# Patient Record
Sex: MALE | Race: BLACK OR AFRICAN AMERICAN | NOT HISPANIC OR LATINO | URBAN - METROPOLITAN AREA
[De-identification: names, ages, dates, MRNs, and addresses within clinical notes are randomized per-mention and may not be internally consistent; named-entity substitution may affect disease eponyms.]

---

## 2021-06-04 ENCOUNTER — OFFICE VISIT (OUTPATIENT)
Dept: URBAN - METROPOLITAN AREA CLINIC 63 | Facility: CLINIC | Age: 61
End: 2021-06-04

## 2021-06-24 ENCOUNTER — OFFICE VISIT (OUTPATIENT)
Dept: URBAN - METROPOLITAN AREA SURGERY CENTER 4 | Facility: SURGERY CENTER | Age: 61
End: 2021-06-24

## 2021-06-25 LAB — PATHOLOGY (INDENTED REPORT): (no result)

## 2022-03-15 ENCOUNTER — APPOINTMENT (RX ONLY)
Dept: URBAN - METROPOLITAN AREA CLINIC 328 | Facility: CLINIC | Age: 62
Setting detail: DERMATOLOGY
End: 2022-03-15

## 2022-03-15 DIAGNOSIS — L663 OTHER SPECIFIED DISEASES OF HAIR AND HAIR FOLLICLES: ICD-10-CM | Status: WELL CONTROLLED

## 2022-03-15 DIAGNOSIS — L738 OTHER SPECIFIED DISEASES OF HAIR AND HAIR FOLLICLES: ICD-10-CM | Status: WELL CONTROLLED

## 2022-03-15 DIAGNOSIS — L73.9 FOLLICULAR DISORDER, UNSPECIFIED: ICD-10-CM | Status: WELL CONTROLLED

## 2022-03-15 DIAGNOSIS — L63.8 OTHER ALOPECIA AREATA: ICD-10-CM

## 2022-03-15 PROBLEM — D23.61 OTHER BENIGN NEOPLASM OF SKIN OF RIGHT UPPER LIMB, INCLUDING SHOULDER: Status: ACTIVE | Noted: 2022-03-15

## 2022-03-15 PROBLEM — L02.222 FURUNCLE OF BACK [ANY PART, EXCEPT BUTTOCK]: Status: ACTIVE | Noted: 2022-03-15

## 2022-03-15 PROCEDURE — ? COUNSELING

## 2022-03-15 PROCEDURE — 11900 INJECT SKIN LESIONS </W 7: CPT

## 2022-03-15 PROCEDURE — 99213 OFFICE O/P EST LOW 20 MIN: CPT | Mod: 25

## 2022-03-15 PROCEDURE — ? IN-HOUSE DISPENSING PHARMACY

## 2022-03-15 PROCEDURE — ? PATHOLOGY DISCUSSION

## 2022-03-15 PROCEDURE — ? INTRALESIONAL KENALOG

## 2022-03-15 PROCEDURE — ? ADDITIONAL NOTES

## 2022-03-15 ASSESSMENT — LOCATION DETAILED DESCRIPTION DERM
LOCATION DETAILED: RIGHT OCCIPITAL SCALP
LOCATION DETAILED: INFERIOR THORACIC SPINE

## 2022-03-15 ASSESSMENT — LOCATION ZONE DERM
LOCATION ZONE: SCALP
LOCATION ZONE: TRUNK

## 2022-03-15 ASSESSMENT — LOCATION SIMPLE DESCRIPTION DERM
LOCATION SIMPLE: UPPER BACK
LOCATION SIMPLE: POSTERIOR SCALP

## 2022-03-15 NOTE — PROCEDURE: IN-HOUSE DISPENSING PHARMACY
Product 78 Units Dispensed: 0
Product 5 Application Directions: For Warts/MC- apply to the areas 2-3 times weekly\\nFor (Pre)Skin Cancer- apply nightly for 2-6 weeks or as directed by provider.
Product 7 Amount/Unit (Numbers Only): 1
Product 13 Unit Type: bottle(s)
Product 63 Unit Type: mg
Product 24 Unit Type: capsules
Product 22 Amount/Unit (Numbers Only): 18
Product 16 Application Directions: Apply to the affected area 1-2 times daily or as directed by provider.
Product 18 Amount/Unit (Numbers Only): 20
Product 1 Application Directions: Apply to the face twice daily (if another rx is being prescribed it will be applied in the AM)
Name Of Product 14: #14 Rosacea Silicone Gel
Product 20 Unit Type: tablets
Name Of Product 21: #21 Minocycline
Name Of Product 10: #10 Fungal Dermatitis Cream
Name Of Product 6: #6 Alopecia Topical Solution
Product 12 Price/Unit (In Dollars): 60
Product 23 Price/Unit (In Dollars): 10
Product 4 Price/Unit (In Dollars): 55
Name Of Product 2: #2 Acne Moisturizing Cream
Product 8 Price/Unit (In Dollars): 35
Name Of Product 17: #17 Xerosis Gel
Product 14 Application Directions: Apply to the face once daily AM or PM
Product 10 Application Directions: Apply to the affected area twice daily for 10 days.
Product 23 Amount/Unit (Numbers Only): 5
Product 21 Application Directions: Take one tablet twice daily with food.
Product 4 Application Directions: Apply to the face at bedtime or as directed by provider.
Product 19 Application Directions: Take one tablet twice daily with food or as directed by provider.
Render Product Pricing In Note: No
Name Of Product 24: #24 Spironolactone
Name Of Product 13: #13 Rosacea Cream
Product 15 Price/Unit (In Dollars): 30
Name Of Product 5: #5 Actinic Keratosis (Gel)
Render Refills If Set To 0: Yes
Name Of Product 9: #9 Dermatitis Cream
Name Of Product 20: #20 Loratadine
Name Of Product 1: #1 Acne Gel
Name Of Product 16: #16 Dermatitis Topical Solution
Product 13 Application Directions: Apply to the face twice daily or as directed by provider.
Product 24 Application Directions: Take one tablet twice daily or as directed by provider.
Product 3 Price/Unit (In Dollars): 40
Product 9 Application Directions: Apply to the affected area three times weekly.
Product 20 Application Directions: Take one tablet daily.
Product 7 Application Directions: Apply to the nails daily. Please do not apply socks for about5 hours (best if applied at night) Remove once weekly then start again.
Product 20 Amount/Unit (Numbers Only): 30
Product 3 Application Directions: Apply to the face once daily AM or PM or as directed by provider.
Name Of Product 8: #8 Anti Fungal Cream
Product 14 [place]/Unit (In Dollars): 45
Name Of Product 12: #12 Melasma Emulsion
Name Of Product 23: #23 Prednisone 50mg
Name Of Product 19: #19 Doxycycline
Detail Level: Zone
Name Of Product 4: #4 Acne Gel
Product 2 Price/Unit (In Dollars): 50
Product 23 Application Directions: Use as directed by provider.
Product 6 Refills: 3
Product 8 Application Directions: Apply to the affected area twice daily.
Product 12 Application Directions: Apply to the face three times weekly at night for two months only. Take a two month break and then continue for two months again. (On your office months, you can apply Lytera daily)
Product 17 Application Directions: Apply to affected area nightly.
Product 6 Application Directions: Apply to the scalp three times weekly
Product 2 Application Directions: Apply to the face at bedtime. Start every other night then increase usage depending on tolerance.
Name Of Product 15: #15 Anti Fungal Shampoo
Name Of Product 11: #11 Antibacterial Ointment
Name Of Product 22: #22 Prednisone 20mg
Name Of Product 18: #18 Bactrim
Name Of Product 7: #7 Anti Fungal Nail Solution
Product 15 Application Directions: Apply to the scalp 2-3 times weekly. Can be applied every other shampoo as well.
Product 20 Price/Unit (In Dollars): 15
Name Of Product 3: #3 Acne Gel Combo
Product 11 Application Directions: Apply twice daily to wound.

## 2022-03-15 NOTE — PROCEDURE: INTRALESIONAL KENALOG
X Size Of Lesion In Cm (Optional): 0
Medical Necessity Clause: This procedure was medically necessary because the lesions that were treated were:
Include Z78.9 (Other Specified Conditions Influencing Health Status) As An Associated Diagnosis?: No
Detail Level: Detailed
Total Volume Injected (Ccs- Only Use Numbers And Decimals): .2
Concentration Of Solution Injected (Mg/Ml): 10.0
Kenalog Preparation: Kenalog
Consent: The risks of atrophy were reviewed with the patient.
Validate Note Data When Using Inventory: Yes

## 2022-05-18 ENCOUNTER — APPOINTMENT (RX ONLY)
Dept: URBAN - METROPOLITAN AREA CLINIC 328 | Facility: CLINIC | Age: 62
Setting detail: DERMATOLOGY
End: 2022-05-18

## 2022-05-18 DIAGNOSIS — L663 OTHER SPECIFIED DISEASES OF HAIR AND HAIR FOLLICLES: ICD-10-CM

## 2022-05-18 DIAGNOSIS — L738 OTHER SPECIFIED DISEASES OF HAIR AND HAIR FOLLICLES: ICD-10-CM

## 2022-05-18 DIAGNOSIS — L63.8 OTHER ALOPECIA AREATA: ICD-10-CM

## 2022-05-18 DIAGNOSIS — L73.9 FOLLICULAR DISORDER, UNSPECIFIED: ICD-10-CM

## 2022-05-18 PROBLEM — L02.424 FURUNCLE OF LEFT UPPER LIMB: Status: ACTIVE | Noted: 2022-05-18

## 2022-05-18 PROBLEM — L02.423 FURUNCLE OF RIGHT UPPER LIMB: Status: ACTIVE | Noted: 2022-05-18

## 2022-05-18 PROCEDURE — ? PRESCRIPTION MEDICATION MANAGEMENT

## 2022-05-18 PROCEDURE — ? COUNSELING

## 2022-05-18 PROCEDURE — 11900 INJECT SKIN LESIONS </W 7: CPT

## 2022-05-18 PROCEDURE — ? INTRALESIONAL KENALOG

## 2022-05-18 PROCEDURE — ? PRESCRIPTION

## 2022-05-18 PROCEDURE — 99213 OFFICE O/P EST LOW 20 MIN: CPT | Mod: 25

## 2022-05-18 RX ORDER — MINOCYCLINE HYDROCHLORIDE 100 MG/1
TABLET ORAL
Qty: 20 | Refills: 1 | Status: ERX | COMMUNITY
Start: 2022-05-18

## 2022-05-18 RX ADMIN — MINOCYCLINE HYDROCHLORIDE: 100 TABLET ORAL at 00:00

## 2022-05-18 ASSESSMENT — LOCATION SIMPLE DESCRIPTION DERM
LOCATION SIMPLE: POSTERIOR SCALP
LOCATION SIMPLE: LEFT SHOULDER
LOCATION SIMPLE: RIGHT SHOULDER

## 2022-05-18 ASSESSMENT — LOCATION DETAILED DESCRIPTION DERM
LOCATION DETAILED: RIGHT POSTERIOR SHOULDER
LOCATION DETAILED: RIGHT OCCIPITAL SCALP
LOCATION DETAILED: LEFT POSTERIOR SHOULDER

## 2022-05-18 ASSESSMENT — LOCATION ZONE DERM
LOCATION ZONE: ARM
LOCATION ZONE: SCALP

## 2022-05-18 NOTE — PROCEDURE: COUNSELING
Detail Level: Detailed
Chlorhexidine Recommendations: Hibiclens - This can be purchased over the counter. Use as a body wash twice a week

## 2022-05-18 NOTE — PROCEDURE: PRESCRIPTION MEDICATION MANAGEMENT
Detail Level: Zone
Render In Strict Bullet Format?: No
Continue Regimen: Dispensary #6 alopecia solution

## 2022-05-18 NOTE — PROCEDURE: INTRALESIONAL KENALOG
X Size Of Lesion In Cm (Optional): 0
Medical Necessity Clause: This procedure was medically necessary because the lesions that were treated were:
Include Z78.9 (Other Specified Conditions Influencing Health Status) As An Associated Diagnosis?: No
Detail Level: Detailed
Total Volume Injected (Ccs- Only Use Numbers And Decimals): .3
Concentration Of Solution Injected (Mg/Ml): 10.0
Kenalog Preparation: Kenalog
Consent: The risks of atrophy were reviewed with the patient.
Validate Note Data When Using Inventory: Yes

## 2022-06-02 ENCOUNTER — APPOINTMENT (RX ONLY)
Dept: URBAN - METROPOLITAN AREA CLINIC 328 | Facility: CLINIC | Age: 62
Setting detail: DERMATOLOGY
End: 2022-06-02

## 2022-06-02 DIAGNOSIS — L20.89 OTHER ATOPIC DERMATITIS: ICD-10-CM | Status: IMPROVED

## 2022-06-02 DIAGNOSIS — L663 OTHER SPECIFIED DISEASES OF HAIR AND HAIR FOLLICLES: ICD-10-CM

## 2022-06-02 DIAGNOSIS — L63.8 OTHER ALOPECIA AREATA: ICD-10-CM

## 2022-06-02 DIAGNOSIS — L738 OTHER SPECIFIED DISEASES OF HAIR AND HAIR FOLLICLES: ICD-10-CM

## 2022-06-02 DIAGNOSIS — L73.9 FOLLICULAR DISORDER, UNSPECIFIED: ICD-10-CM

## 2022-06-02 PROBLEM — L20.84 INTRINSIC (ALLERGIC) ECZEMA: Status: ACTIVE | Noted: 2022-06-02

## 2022-06-02 PROBLEM — L02.424 FURUNCLE OF LEFT UPPER LIMB: Status: ACTIVE | Noted: 2022-06-02

## 2022-06-02 PROBLEM — L02.423 FURUNCLE OF RIGHT UPPER LIMB: Status: ACTIVE | Noted: 2022-06-02

## 2022-06-02 PROBLEM — L02.821 FURUNCLE OF HEAD [ANY PART, EXCEPT FACE]: Status: ACTIVE | Noted: 2022-06-02

## 2022-06-02 PROCEDURE — ? COUNSELING

## 2022-06-02 PROCEDURE — ? PRESCRIPTION MEDICATION MANAGEMENT

## 2022-06-02 PROCEDURE — 99214 OFFICE O/P EST MOD 30 MIN: CPT

## 2022-06-02 ASSESSMENT — LOCATION SIMPLE DESCRIPTION DERM
LOCATION SIMPLE: ANTERIOR SCALP
LOCATION SIMPLE: LEFT UPPER BACK
LOCATION SIMPLE: LEFT UPPER BACK
LOCATION SIMPLE: RIGHT SCALP
LOCATION SIMPLE: RIGHT SHOULDER
LOCATION SIMPLE: LEFT SHOULDER

## 2022-06-02 ASSESSMENT — LOCATION DETAILED DESCRIPTION DERM
LOCATION DETAILED: MID-FRONTAL SCALP
LOCATION DETAILED: LEFT POSTERIOR SHOULDER
LOCATION DETAILED: RIGHT CENTRAL FRONTAL SCALP
LOCATION DETAILED: LEFT SUPERIOR UPPER BACK
LOCATION DETAILED: RIGHT POSTERIOR SHOULDER
LOCATION DETAILED: LEFT SUPERIOR UPPER BACK

## 2022-06-02 ASSESSMENT — LOCATION ZONE DERM
LOCATION ZONE: SCALP
LOCATION ZONE: TRUNK
LOCATION ZONE: ARM
LOCATION ZONE: TRUNK

## 2022-06-02 NOTE — PROCEDURE: PRESCRIPTION MEDICATION MANAGEMENT
Continue Regimen: Per DS continue using triamcinolone on affected areas
Detail Level: Zone
Render In Strict Bullet Format?: No
Continue Regimen: Continue using Rx solution as needed
Continue Regimen: Continue using clindamycin bid as needed

## 2022-07-09 ENCOUNTER — TELEPHONE ENCOUNTER (OUTPATIENT)
Dept: URBAN - METROPOLITAN AREA CLINIC 121 | Facility: CLINIC | Age: 62
End: 2022-07-09

## 2022-07-09 RX ORDER — CYCLOBENZAPRINE HYDROCHLORIDE 10 MG/1
TABLET, FILM COATED ORAL AS NEEDED
Refills: 0 | OUTPATIENT
Start: 2017-06-28 | End: 2017-07-13

## 2022-07-09 RX ORDER — CYCLOBENZAPRINE HYDROCHLORIDE 10 MG/1
TABLET, FILM COATED ORAL
Refills: 0 | OUTPATIENT
Start: 2015-12-09 | End: 2017-06-28

## 2022-07-09 RX ORDER — CYCLOBENZAPRINE HYDROCHLORIDE 10 MG/1
TABLET, FILM COATED ORAL AS NEEDED
Refills: 0 | OUTPATIENT
Start: 2017-09-07 | End: 2021-06-04

## 2022-07-09 RX ORDER — OXYCODONE AND ACETAMINOPHEN 7.5; 325 MG/1; MG/1
TABLET ORAL AS NEEDED
Refills: 0 | OUTPATIENT
Start: 2017-05-15 | End: 2017-07-13

## 2022-07-09 RX ORDER — CYCLOBENZAPRINE HYDROCHLORIDE 10 MG/1
TABLET, FILM COATED ORAL AS NEEDED
Refills: 0 | OUTPATIENT
Start: 2017-07-13 | End: 2017-09-07

## 2022-07-09 RX ORDER — CYCLOBENZAPRINE HYDROCHLORIDE 10 MG/1
TABLET, FILM COATED ORAL
Refills: 0 | OUTPATIENT
Start: 2015-11-16 | End: 2015-12-09

## 2022-07-09 RX ORDER — OXYCODONE AND ACETAMINOPHEN 325; 10 MG/1; MG/1
1 TAB QID TABLET ORAL
Refills: 0 | OUTPATIENT
Start: 2015-11-16 | End: 2015-12-09

## 2022-07-09 RX ORDER — OXYCODONE AND ACETAMINOPHEN 325; 10 MG/1; MG/1
1 TAB QID TABLET ORAL
Refills: 0 | OUTPATIENT
Start: 2015-12-09 | End: 2017-06-28

## 2022-07-09 RX ORDER — OXYCODONE AND ACETAMINOPHEN 325; 10 MG/1; MG/1
1 TAB QID TABLET ORAL AS NEEDED
Refills: 0 | OUTPATIENT
Start: 2017-06-28 | End: 2017-06-28

## 2022-07-10 ENCOUNTER — TELEPHONE ENCOUNTER (OUTPATIENT)
Dept: URBAN - METROPOLITAN AREA CLINIC 121 | Facility: CLINIC | Age: 62
End: 2022-07-10

## 2022-07-10 RX ORDER — POLYETHYLENE GLYCOL 3350, SODIUM SULFATE, SODIUM CHLORIDE, POTASSIUM CHLORIDE, ASCORBIC ACID, SODIUM ASCORBATE 140-9-5.2G
TAKE AS DIRECTED KIT ORAL TAKE AS DIRECTED
Refills: 0 | Status: ACTIVE | COMMUNITY
Start: 2021-06-04

## 2022-07-10 RX ORDER — AMLODIPINE BESYLATE 5 MG/1
TABLET ORAL
Refills: 0 | Status: ACTIVE | COMMUNITY
Start: 2021-05-21

## 2022-07-10 RX ORDER — ASPIRIN 81 MG/1
TABLET, CHEWABLE ORAL
Refills: 0 | Status: ACTIVE | COMMUNITY
Start: 2021-06-04

## 2022-07-10 RX ORDER — ESCITALOPRAM 20 MG/1
TABLET, FILM COATED ORAL
Refills: 0 | Status: ACTIVE | COMMUNITY
Start: 2021-03-16

## 2022-07-10 RX ORDER — OXYCODONE AND ACETAMINOPHEN 7.5; 325 MG/1; MG/1
TABLET ORAL AS NEEDED
Refills: 0 | Status: ACTIVE | COMMUNITY
Start: 2017-07-13

## 2022-07-12 ENCOUNTER — APPOINTMENT (RX ONLY)
Dept: URBAN - METROPOLITAN AREA CLINIC 328 | Facility: CLINIC | Age: 62
Setting detail: DERMATOLOGY
End: 2022-07-12

## 2022-07-12 DIAGNOSIS — L63.8 OTHER ALOPECIA AREATA: ICD-10-CM

## 2022-07-12 DIAGNOSIS — B86 SCABIES: ICD-10-CM | Status: INADEQUATELY CONTROLLED

## 2022-07-12 DIAGNOSIS — L73.9 FOLLICULAR DISORDER, UNSPECIFIED: ICD-10-CM

## 2022-07-12 DIAGNOSIS — L20.89 OTHER ATOPIC DERMATITIS: ICD-10-CM

## 2022-07-12 DIAGNOSIS — L663 OTHER SPECIFIED DISEASES OF HAIR AND HAIR FOLLICLES: ICD-10-CM

## 2022-07-12 DIAGNOSIS — L738 OTHER SPECIFIED DISEASES OF HAIR AND HAIR FOLLICLES: ICD-10-CM

## 2022-07-12 PROBLEM — L02.423 FURUNCLE OF RIGHT UPPER LIMB: Status: ACTIVE | Noted: 2022-07-12

## 2022-07-12 PROBLEM — L02.424 FURUNCLE OF LEFT UPPER LIMB: Status: ACTIVE | Noted: 2022-07-12

## 2022-07-12 PROBLEM — L20.84 INTRINSIC (ALLERGIC) ECZEMA: Status: ACTIVE | Noted: 2022-07-12

## 2022-07-12 PROBLEM — L02.821 FURUNCLE OF HEAD [ANY PART, EXCEPT FACE]: Status: ACTIVE | Noted: 2022-07-12

## 2022-07-12 PROCEDURE — ? PRESCRIPTION MEDICATION MANAGEMENT

## 2022-07-12 PROCEDURE — ? PRESCRIPTION

## 2022-07-12 PROCEDURE — ? COUNSELING

## 2022-07-12 PROCEDURE — 99214 OFFICE O/P EST MOD 30 MIN: CPT

## 2022-07-12 PROCEDURE — ? ADDITIONAL NOTES

## 2022-07-12 RX ORDER — IVERMECTIN 3 MG/1
TABLET ORAL X 1
Qty: 8 | Refills: 0 | Status: ERX | COMMUNITY
Start: 2022-07-12

## 2022-07-12 RX ADMIN — IVERMECTIN: 3 TABLET ORAL at 00:00

## 2022-07-12 ASSESSMENT — LOCATION ZONE DERM
LOCATION ZONE: TRUNK
LOCATION ZONE: SCALP
LOCATION ZONE: ARM
LOCATION ZONE: TRUNK

## 2022-07-12 ASSESSMENT — LOCATION DETAILED DESCRIPTION DERM
LOCATION DETAILED: LEFT SUPERIOR UPPER BACK
LOCATION DETAILED: LEFT POSTERIOR SHOULDER
LOCATION DETAILED: LEFT SUPERIOR UPPER BACK
LOCATION DETAILED: MID-FRONTAL SCALP
LOCATION DETAILED: RIGHT INFERIOR MEDIAL UPPER BACK
LOCATION DETAILED: RIGHT POSTERIOR SHOULDER
LOCATION DETAILED: RIGHT CENTRAL FRONTAL SCALP

## 2022-07-12 ASSESSMENT — LOCATION SIMPLE DESCRIPTION DERM
LOCATION SIMPLE: LEFT UPPER BACK
LOCATION SIMPLE: RIGHT SHOULDER
LOCATION SIMPLE: ANTERIOR SCALP
LOCATION SIMPLE: RIGHT UPPER BACK
LOCATION SIMPLE: RIGHT SCALP
LOCATION SIMPLE: LEFT SHOULDER
LOCATION SIMPLE: LEFT UPPER BACK

## 2022-07-12 NOTE — PROCEDURE: ADDITIONAL NOTES
Additional Notes: Claritin in the morning, Zyrtec at night
Detail Level: Simple
Render Risk Assessment In Note?: no

## 2022-07-12 NOTE — PROCEDURE: PRESCRIPTION MEDICATION MANAGEMENT
Continue Regimen: Continue using Rx solution as needed
Render In Strict Bullet Format?: No
Detail Level: Zone
Continue Regimen: Continue using clindamycin bid as needed
Continue Regimen: Per DS continue using triamcinolone on affected areas

## 2022-07-14 ENCOUNTER — RX ONLY (OUTPATIENT)
Age: 62
Setting detail: RX ONLY
End: 2022-07-14

## 2022-07-14 RX ORDER — IVERMECTIN 3 MG/1
TABLET ORAL
Qty: 8 | Refills: 0 | Status: ERX

## 2022-08-03 ENCOUNTER — APPOINTMENT (RX ONLY)
Dept: URBAN - METROPOLITAN AREA CLINIC 328 | Facility: CLINIC | Age: 62
Setting detail: DERMATOLOGY
End: 2022-08-03

## 2022-08-03 DIAGNOSIS — B86 SCABIES: ICD-10-CM | Status: IMPROVED

## 2022-08-03 DIAGNOSIS — L738 OTHER SPECIFIED DISEASES OF HAIR AND HAIR FOLLICLES: ICD-10-CM | Status: IMPROVED

## 2022-08-03 DIAGNOSIS — L73.9 FOLLICULAR DISORDER, UNSPECIFIED: ICD-10-CM | Status: IMPROVED

## 2022-08-03 DIAGNOSIS — L663 OTHER SPECIFIED DISEASES OF HAIR AND HAIR FOLLICLES: ICD-10-CM | Status: IMPROVED

## 2022-08-03 DIAGNOSIS — L63.8 OTHER ALOPECIA AREATA: ICD-10-CM

## 2022-08-03 PROBLEM — L02.424 FURUNCLE OF LEFT UPPER LIMB: Status: ACTIVE | Noted: 2022-08-03

## 2022-08-03 PROBLEM — L02.423 FURUNCLE OF RIGHT UPPER LIMB: Status: ACTIVE | Noted: 2022-08-03

## 2022-08-03 PROCEDURE — ? COUNSELING

## 2022-08-03 PROCEDURE — 99212 OFFICE O/P EST SF 10 MIN: CPT | Mod: 25

## 2022-08-03 PROCEDURE — 11900 INJECT SKIN LESIONS </W 7: CPT

## 2022-08-03 PROCEDURE — ? INTRALESIONAL KENALOG

## 2022-08-03 ASSESSMENT — LOCATION ZONE DERM
LOCATION ZONE: ARM
LOCATION ZONE: SCALP
LOCATION ZONE: TRUNK

## 2022-08-03 ASSESSMENT — LOCATION SIMPLE DESCRIPTION DERM
LOCATION SIMPLE: POSTERIOR SCALP
LOCATION SIMPLE: RIGHT SHOULDER
LOCATION SIMPLE: RIGHT UPPER BACK
LOCATION SIMPLE: LEFT SHOULDER

## 2022-08-03 ASSESSMENT — LOCATION DETAILED DESCRIPTION DERM
LOCATION DETAILED: RIGHT INFERIOR MEDIAL UPPER BACK
LOCATION DETAILED: LEFT POSTERIOR SHOULDER
LOCATION DETAILED: RIGHT POSTERIOR SHOULDER
LOCATION DETAILED: RIGHT INFERIOR OCCIPITAL SCALP

## 2022-08-03 NOTE — PROCEDURE: INTRALESIONAL KENALOG
normal... Concentration Of Solution Injected (Mg/Ml): 10.0 Well appearing, awake, alert, oriented to person, place, time/situation and in no apparent distress.

## 2023-01-17 ENCOUNTER — APPOINTMENT (RX ONLY)
Dept: URBAN - METROPOLITAN AREA CLINIC 328 | Facility: CLINIC | Age: 63
Setting detail: DERMATOLOGY
End: 2023-01-17

## 2023-01-17 DIAGNOSIS — L63.8 OTHER ALOPECIA AREATA: ICD-10-CM | Status: IMPROVED

## 2023-01-17 PROCEDURE — ? COUNSELING

## 2023-01-17 PROCEDURE — ? PRESCRIPTION MEDICATION MANAGEMENT

## 2023-01-17 PROCEDURE — 99213 OFFICE O/P EST LOW 20 MIN: CPT

## 2023-01-17 ASSESSMENT — LOCATION SIMPLE DESCRIPTION DERM: LOCATION SIMPLE: POSTERIOR SCALP

## 2023-01-17 ASSESSMENT — LOCATION DETAILED DESCRIPTION DERM: LOCATION DETAILED: RIGHT OCCIPITAL SCALP

## 2023-01-17 ASSESSMENT — LOCATION ZONE DERM: LOCATION ZONE: SCALP
